# Patient Record
Sex: MALE | Race: ASIAN | NOT HISPANIC OR LATINO | URBAN - METROPOLITAN AREA
[De-identification: names, ages, dates, MRNs, and addresses within clinical notes are randomized per-mention and may not be internally consistent; named-entity substitution may affect disease eponyms.]

---

## 2017-05-15 ENCOUNTER — OUTPATIENT (OUTPATIENT)
Dept: OUTPATIENT SERVICES | Facility: HOSPITAL | Age: 47
LOS: 1 days | Discharge: HOME | End: 2017-05-15

## 2017-06-28 DIAGNOSIS — R52 PAIN, UNSPECIFIED: ICD-10-CM

## 2020-02-12 PROBLEM — Z00.00 ENCOUNTER FOR PREVENTIVE HEALTH EXAMINATION: Status: ACTIVE | Noted: 2020-02-12

## 2020-02-27 ENCOUNTER — APPOINTMENT (OUTPATIENT)
Dept: CARDIOLOGY | Facility: CLINIC | Age: 50
End: 2020-02-27

## 2021-10-28 ENCOUNTER — INPATIENT (INPATIENT)
Facility: HOSPITAL | Age: 51
LOS: 1 days | Discharge: HOME | End: 2021-10-30
Attending: INTERNAL MEDICINE | Admitting: INTERNAL MEDICINE
Payer: COMMERCIAL

## 2021-10-28 VITALS
TEMPERATURE: 96 F | HEART RATE: 85 BPM | DIASTOLIC BLOOD PRESSURE: 91 MMHG | WEIGHT: 265 LBS | RESPIRATION RATE: 17 BRPM | SYSTOLIC BLOOD PRESSURE: 160 MMHG | OXYGEN SATURATION: 100 %

## 2021-10-28 DIAGNOSIS — Z98.890 OTHER SPECIFIED POSTPROCEDURAL STATES: Chronic | ICD-10-CM

## 2021-10-28 LAB
ALBUMIN SERPL ELPH-MCNC: 4.6 G/DL — SIGNIFICANT CHANGE UP (ref 3.5–5.2)
ALP SERPL-CCNC: 61 U/L — SIGNIFICANT CHANGE UP (ref 30–115)
ALT FLD-CCNC: 23 U/L — SIGNIFICANT CHANGE UP (ref 0–41)
ANION GAP SERPL CALC-SCNC: 12 MMOL/L — SIGNIFICANT CHANGE UP (ref 7–14)
AST SERPL-CCNC: 16 U/L — SIGNIFICANT CHANGE UP (ref 0–41)
BASOPHILS # BLD AUTO: 0.04 K/UL — SIGNIFICANT CHANGE UP (ref 0–0.2)
BASOPHILS NFR BLD AUTO: 0.6 % — SIGNIFICANT CHANGE UP (ref 0–1)
BILIRUB SERPL-MCNC: 0.5 MG/DL — SIGNIFICANT CHANGE UP (ref 0.2–1.2)
BUN SERPL-MCNC: 12 MG/DL — SIGNIFICANT CHANGE UP (ref 10–20)
CALCIUM SERPL-MCNC: 9.6 MG/DL — SIGNIFICANT CHANGE UP (ref 8.5–10.1)
CHLORIDE SERPL-SCNC: 104 MMOL/L — SIGNIFICANT CHANGE UP (ref 98–110)
CK MB CFR SERPL CALC: 6.4 NG/ML — HIGH (ref 0.6–6.3)
CK SERPL-CCNC: 178 U/L — SIGNIFICANT CHANGE UP (ref 0–225)
CO2 SERPL-SCNC: 26 MMOL/L — SIGNIFICANT CHANGE UP (ref 17–32)
CREAT SERPL-MCNC: 1.1 MG/DL — SIGNIFICANT CHANGE UP (ref 0.7–1.5)
EOSINOPHIL # BLD AUTO: 0.15 K/UL — SIGNIFICANT CHANGE UP (ref 0–0.7)
EOSINOPHIL NFR BLD AUTO: 2.4 % — SIGNIFICANT CHANGE UP (ref 0–8)
GLUCOSE SERPL-MCNC: 222 MG/DL — HIGH (ref 70–99)
HCT VFR BLD CALC: 48.5 % — SIGNIFICANT CHANGE UP (ref 42–52)
HGB BLD-MCNC: 16.2 G/DL — SIGNIFICANT CHANGE UP (ref 14–18)
IMM GRANULOCYTES NFR BLD AUTO: 0.2 % — SIGNIFICANT CHANGE UP (ref 0.1–0.3)
LYMPHOCYTES # BLD AUTO: 1.8 K/UL — SIGNIFICANT CHANGE UP (ref 1.2–3.4)
LYMPHOCYTES # BLD AUTO: 28.5 % — SIGNIFICANT CHANGE UP (ref 20.5–51.1)
MCHC RBC-ENTMCNC: 29.2 PG — SIGNIFICANT CHANGE UP (ref 27–31)
MCHC RBC-ENTMCNC: 33.4 G/DL — SIGNIFICANT CHANGE UP (ref 32–37)
MCV RBC AUTO: 87.5 FL — SIGNIFICANT CHANGE UP (ref 80–94)
MONOCYTES # BLD AUTO: 0.47 K/UL — SIGNIFICANT CHANGE UP (ref 0.1–0.6)
MONOCYTES NFR BLD AUTO: 7.4 % — SIGNIFICANT CHANGE UP (ref 1.7–9.3)
NEUTROPHILS # BLD AUTO: 3.84 K/UL — SIGNIFICANT CHANGE UP (ref 1.4–6.5)
NEUTROPHILS NFR BLD AUTO: 60.9 % — SIGNIFICANT CHANGE UP (ref 42.2–75.2)
NRBC # BLD: 0 /100 WBCS — SIGNIFICANT CHANGE UP (ref 0–0)
PLATELET # BLD AUTO: 218 K/UL — SIGNIFICANT CHANGE UP (ref 130–400)
POTASSIUM SERPL-MCNC: 4.7 MMOL/L — SIGNIFICANT CHANGE UP (ref 3.5–5)
POTASSIUM SERPL-SCNC: 4.7 MMOL/L — SIGNIFICANT CHANGE UP (ref 3.5–5)
PROT SERPL-MCNC: 7.7 G/DL — SIGNIFICANT CHANGE UP (ref 6–8)
RBC # BLD: 5.54 M/UL — SIGNIFICANT CHANGE UP (ref 4.7–6.1)
RBC # FLD: 12.4 % — SIGNIFICANT CHANGE UP (ref 11.5–14.5)
SARS-COV-2 RNA SPEC QL NAA+PROBE: SIGNIFICANT CHANGE UP
SODIUM SERPL-SCNC: 142 MMOL/L — SIGNIFICANT CHANGE UP (ref 135–146)
TROPONIN T SERPL-MCNC: 0.04 NG/ML — CRITICAL HIGH
TROPONIN T SERPL-MCNC: 0.06 NG/ML — CRITICAL HIGH
TROPONIN T SERPL-MCNC: <0.01 NG/ML — SIGNIFICANT CHANGE UP
WBC # BLD: 6.31 K/UL — SIGNIFICANT CHANGE UP (ref 4.8–10.8)
WBC # FLD AUTO: 6.31 K/UL — SIGNIFICANT CHANGE UP (ref 4.8–10.8)

## 2021-10-28 PROCEDURE — 93010 ELECTROCARDIOGRAM REPORT: CPT

## 2021-10-28 PROCEDURE — 93010 ELECTROCARDIOGRAM REPORT: CPT | Mod: 77

## 2021-10-28 PROCEDURE — 76937 US GUIDE VASCULAR ACCESS: CPT | Mod: 26

## 2021-10-28 PROCEDURE — 36000 PLACE NEEDLE IN VEIN: CPT

## 2021-10-28 PROCEDURE — 99234 HOSP IP/OBS SM DT SF/LOW 45: CPT | Mod: 25

## 2021-10-28 PROCEDURE — 93308 TTE F-UP OR LMTD: CPT | Mod: 26

## 2021-10-28 PROCEDURE — 71045 X-RAY EXAM CHEST 1 VIEW: CPT | Mod: 26

## 2021-10-28 PROCEDURE — 99223 1ST HOSP IP/OBS HIGH 75: CPT | Mod: GC

## 2021-10-28 RX ORDER — ASPIRIN/CALCIUM CARB/MAGNESIUM 324 MG
324 TABLET ORAL ONCE
Refills: 0 | Status: COMPLETED | OUTPATIENT
Start: 2021-10-28 | End: 2021-10-28

## 2021-10-28 RX ORDER — METOPROLOL TARTRATE 50 MG
100 TABLET ORAL
Refills: 0 | Status: DISCONTINUED | OUTPATIENT
Start: 2021-10-28 | End: 2021-10-30

## 2021-10-28 RX ORDER — ATORVASTATIN CALCIUM 80 MG/1
40 TABLET, FILM COATED ORAL AT BEDTIME
Refills: 0 | Status: DISCONTINUED | OUTPATIENT
Start: 2021-10-28 | End: 2021-10-29

## 2021-10-28 RX ORDER — ENOXAPARIN SODIUM 100 MG/ML
40 INJECTION SUBCUTANEOUS DAILY
Refills: 0 | Status: DISCONTINUED | OUTPATIENT
Start: 2021-10-28 | End: 2021-10-29

## 2021-10-28 RX ORDER — ACETAMINOPHEN 500 MG
650 TABLET ORAL EVERY 6 HOURS
Refills: 0 | Status: DISCONTINUED | OUTPATIENT
Start: 2021-10-28 | End: 2021-10-30

## 2021-10-28 RX ORDER — ASPIRIN/CALCIUM CARB/MAGNESIUM 324 MG
81 TABLET ORAL DAILY
Refills: 0 | Status: DISCONTINUED | OUTPATIENT
Start: 2021-10-28 | End: 2021-10-30

## 2021-10-28 RX ORDER — METOPROLOL TARTRATE 50 MG
100 TABLET ORAL ONCE
Refills: 0 | Status: COMPLETED | OUTPATIENT
Start: 2021-10-28 | End: 2021-10-28

## 2021-10-28 RX ADMIN — Medication 324 MILLIGRAM(S): at 12:50

## 2021-10-28 RX ADMIN — Medication 100 MILLIGRAM(S): at 12:43

## 2021-10-28 NOTE — ED CDU PROVIDER INITIAL DAY NOTE - MEDICAL DECISION MAKING DETAILS
49yo man h/o HTN< DM, HLD, ABDI was placed in CDU for chest pain workup after an unremarkable ED eval with labs, EKG, CXR. 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 VS, exam as noted, pt nontoxic appearing, lungs CTA, CVS1S2 RRR abd soft, NT, ND. Plan is for telemetry, serial EKG/enzymes, CCTA.

## 2021-10-28 NOTE — ED PROVIDER NOTE - CARDIAC, MLM
Normal rate, regular rhythm.  Heart sounds S1, S2.  No murmurs, rubs or gallops.  No lower extremity tenderness/edema.

## 2021-10-28 NOTE — ED PROCEDURE NOTE - PROCEDURE NAME, MLM
Point of Care Ultrasound Vascular Access
Point of Care Ultrasound Cardiac
Point of Care Ultrasound Cardiac

## 2021-10-28 NOTE — ED ADULT NURSE NOTE - PRO INTERPRETER NEED 2
Per Tadeo, if a sample is received and the order is , they dispose of the sample. Therefore patient will need to complete the test again. I will fill out and fax a new order. We did receive the  order in Oct 2019 but there was notification from them that a sample had been received with an  order. Unfortunately.    English

## 2021-10-28 NOTE — CHART NOTE - NSCHARTNOTEFT_GEN_A_CORE
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 Pain is substernal and pressure like sensation. Nonexertional, nonpleuritic and nonreproducible in nature. Patient admitted to obs unit initially and planned for CCTA. However, patient noted trop 0.04 and subsequently writer called to bedside for further eval. Patient noted VS stable and no acute distress. RRR, no murmur. No LE swelling. Patient denied active chest pain at present. EKG reviewed without acute ST changes. Will admit patient to  cardiology telemetry unit for further care.    Plan:  - Repeat EKG and check next set of enzymes at midnight.  - If recurrent chest pain or significant trop increase, will treat with hep gtt.  - C/w all home regimen including ASA, statin and BB.  - CCTA in the am.  - BP control.

## 2021-10-28 NOTE — ED CDU PROVIDER INITIAL DAY NOTE - PROGRESS NOTE DETAILS
pt seen bedside and given copy of lab results because he wants to take a look. pt will go for CCTA in the morning. aware and in agreement to plan. 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 Jim Blancas tele; cardio fellow to see pt in Ed;

## 2021-10-28 NOTE — ED CDU PROVIDER INITIAL DAY NOTE - ATTENDING CONTRIBUTION TO CARE
51yo man h/o HTN< DM, HLD, ABDI was placed in CDU for chest pain workup after an unremarkable ED eval with labs, EKG, CXR. 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 VS, exam as noted, pt nontoxic appearing, lungs CTA, CVS1S2 RRR abd soft, NT, ND. Plan is for telemetry, serial EKG/enzymes, CCTA.

## 2021-10-28 NOTE — ED PROVIDER NOTE - OBJECTIVE STATEMENT
50 y.o. male with a PMH of HTN, hyperlipidemia, and DM presented to the ER c/o intermittent chest pain for the past several days associated with high BP.  Pt states that pain worse with exertion.  (+) diaphoresis and radiation to Left arm and jaw.  Feeling better.  (+) family h/o early heart disease- father had CABGx3 at age 57.  Pt denies fever, chills, cough, SOB, nausea, vomiting, palpitations, recent travel, smoking, drug use. No other complaints.

## 2021-10-28 NOTE — ED PROVIDER NOTE - ATTENDING CONTRIBUTION TO CARE
49 y/o male h/o HTN, HLD, DM, ABDI, non-smoker, FH CAD (father) p/w CP x several days, intermittent, occasionally radiates to neck / jaw, somewhat worse with exertion, denies  fever, nausea, vomiting, diaphoresis, hemoptysis, SOB, orthopnea, PND, LE pain or swelling, recent immobilization or travel or other associated complaints at present.    Previous cardiac evaluation; denies  No old chart available for review.  I have reviewed and agree with the initial nursing note, except as documented in my note.    VSS, awake, alert, oropharynx clear, mmm, no JVD or bruit, no skin rash or lesions, chest CTAB, non-labored breathing, no w/r/r, +S1/S2, RRR, no m/r/g, abdomen soft, NT, ND, +BS, no pulsatile masses, no peripheral edema or deformities, equal pulses upper and lower extremities, alert, clear speech.

## 2021-10-28 NOTE — ED CDU PROVIDER INITIAL DAY NOTE - OBJECTIVE STATEMENT
pt is a 51 y/o male pmhx of HTN, HLD, DM, ABDI, comes to the ed c/o CP x 3 days. pt states cp radiates to neck /jaw. pt states his cp started when playing sports outside, and is worse with exertion. pt state he has strong family hx of CAD. mother and father had MI's at young age like 40's pt also c/o extremely high BP recently and bad headaches. pt states he was not sure if the headache was due to not smoking weed for 2 weeks. pt states he ahs been smoking weed for 15 yrs. pt denies  fever, nausea, vomiting, diaphoresis, hemoptysis, orthopnea, PND, LE pain or swelling. pt is a 51 y/o male h/o HTN, HLD, DM, ABDI, non-smoker,  CP x several days, intermittent, occasionally radiates to neck / jaw, somewhat worse with exertion, denies  fever, nausea, vomiting, diaphoresis, hemoptysis, SOB, orthopnea, PND, LE pain or swelling, recent immobilization or travel or other associated complaints at present. pt is a 49 y/o male h/o HTN, HLD, DM, ABDI, non-smoker presents  c/o  CP radiating to neck / jaw. pt states he has had the chest pain on and off for a few days and its not getting better. pt rates the pain 7/10. pt states pain is worse with exertion. pt denies  fever, nausea, vomiting, diaphoresis, hemoptysis, SOB, orthopnea, PND, LE pain or swelling, recent immobilization or travel or other associated complaints at present.

## 2021-10-29 LAB
A1C WITH ESTIMATED AVERAGE GLUCOSE RESULT: 8.8 % — HIGH (ref 4–5.6)
ALBUMIN SERPL ELPH-MCNC: 4.3 G/DL — SIGNIFICANT CHANGE UP (ref 3.5–5.2)
ALP SERPL-CCNC: 60 U/L — SIGNIFICANT CHANGE UP (ref 30–115)
ALT FLD-CCNC: 24 U/L — SIGNIFICANT CHANGE UP (ref 0–41)
ANION GAP SERPL CALC-SCNC: 12 MMOL/L — SIGNIFICANT CHANGE UP (ref 7–14)
APTT BLD: 51.6 SEC — HIGH (ref 27–39.2)
AST SERPL-CCNC: 21 U/L — SIGNIFICANT CHANGE UP (ref 0–41)
BASOPHILS # BLD AUTO: 0.04 K/UL — SIGNIFICANT CHANGE UP (ref 0–0.2)
BASOPHILS NFR BLD AUTO: 0.7 % — SIGNIFICANT CHANGE UP (ref 0–1)
BILIRUB SERPL-MCNC: 0.6 MG/DL — SIGNIFICANT CHANGE UP (ref 0.2–1.2)
BUN SERPL-MCNC: 13 MG/DL — SIGNIFICANT CHANGE UP (ref 10–20)
CALCIUM SERPL-MCNC: 9.5 MG/DL — SIGNIFICANT CHANGE UP (ref 8.5–10.1)
CHLORIDE SERPL-SCNC: 102 MMOL/L — SIGNIFICANT CHANGE UP (ref 98–110)
CHOLEST SERPL-MCNC: 154 MG/DL — SIGNIFICANT CHANGE UP
CO2 SERPL-SCNC: 25 MMOL/L — SIGNIFICANT CHANGE UP (ref 17–32)
COVID-19 SPIKE DOMAIN AB INTERP: POSITIVE
COVID-19 SPIKE DOMAIN ANTIBODY RESULT: >250 U/ML — HIGH
CREAT SERPL-MCNC: 1.1 MG/DL — SIGNIFICANT CHANGE UP (ref 0.7–1.5)
EOSINOPHIL # BLD AUTO: 0.17 K/UL — SIGNIFICANT CHANGE UP (ref 0–0.7)
EOSINOPHIL NFR BLD AUTO: 2.8 % — SIGNIFICANT CHANGE UP (ref 0–8)
ESTIMATED AVERAGE GLUCOSE: 206 MG/DL — HIGH (ref 68–114)
GLUCOSE BLDC GLUCOMTR-MCNC: 176 MG/DL — HIGH (ref 70–99)
GLUCOSE BLDC GLUCOMTR-MCNC: 196 MG/DL — HIGH (ref 70–99)
GLUCOSE BLDC GLUCOMTR-MCNC: 210 MG/DL — HIGH (ref 70–99)
GLUCOSE SERPL-MCNC: 221 MG/DL — HIGH (ref 70–99)
HCT VFR BLD CALC: 50.5 % — SIGNIFICANT CHANGE UP (ref 42–52)
HDLC SERPL-MCNC: 35 MG/DL — LOW
HGB BLD-MCNC: 16.4 G/DL — SIGNIFICANT CHANGE UP (ref 14–18)
IMM GRANULOCYTES NFR BLD AUTO: 0.3 % — SIGNIFICANT CHANGE UP (ref 0.1–0.3)
LIPID PNL WITH DIRECT LDL SERPL: 85 MG/DL — SIGNIFICANT CHANGE UP
LYMPHOCYTES # BLD AUTO: 1.72 K/UL — SIGNIFICANT CHANGE UP (ref 1.2–3.4)
LYMPHOCYTES # BLD AUTO: 28.5 % — SIGNIFICANT CHANGE UP (ref 20.5–51.1)
MAGNESIUM SERPL-MCNC: 2.1 MG/DL — SIGNIFICANT CHANGE UP (ref 1.8–2.4)
MCHC RBC-ENTMCNC: 28.8 PG — SIGNIFICANT CHANGE UP (ref 27–31)
MCHC RBC-ENTMCNC: 32.5 G/DL — SIGNIFICANT CHANGE UP (ref 32–37)
MCV RBC AUTO: 88.8 FL — SIGNIFICANT CHANGE UP (ref 80–94)
MONOCYTES # BLD AUTO: 0.44 K/UL — SIGNIFICANT CHANGE UP (ref 0.1–0.6)
MONOCYTES NFR BLD AUTO: 7.3 % — SIGNIFICANT CHANGE UP (ref 1.7–9.3)
NEUTROPHILS # BLD AUTO: 3.65 K/UL — SIGNIFICANT CHANGE UP (ref 1.4–6.5)
NEUTROPHILS NFR BLD AUTO: 60.4 % — SIGNIFICANT CHANGE UP (ref 42.2–75.2)
NON HDL CHOLESTEROL: 119 MG/DL — SIGNIFICANT CHANGE UP
NRBC # BLD: 0 /100 WBCS — SIGNIFICANT CHANGE UP (ref 0–0)
PLATELET # BLD AUTO: 210 K/UL — SIGNIFICANT CHANGE UP (ref 130–400)
POTASSIUM SERPL-MCNC: 4.3 MMOL/L — SIGNIFICANT CHANGE UP (ref 3.5–5)
POTASSIUM SERPL-SCNC: 4.3 MMOL/L — SIGNIFICANT CHANGE UP (ref 3.5–5)
PROT SERPL-MCNC: 7.5 G/DL — SIGNIFICANT CHANGE UP (ref 6–8)
RBC # BLD: 5.69 M/UL — SIGNIFICANT CHANGE UP (ref 4.7–6.1)
RBC # FLD: 12.6 % — SIGNIFICANT CHANGE UP (ref 11.5–14.5)
SARS-COV-2 IGG+IGM SERPL QL IA: >250 U/ML — HIGH
SARS-COV-2 IGG+IGM SERPL QL IA: POSITIVE
SODIUM SERPL-SCNC: 139 MMOL/L — SIGNIFICANT CHANGE UP (ref 135–146)
TRIGL SERPL-MCNC: 189 MG/DL — HIGH
TROPONIN T SERPL-MCNC: 0.04 NG/ML — CRITICAL HIGH
TSH SERPL-MCNC: 2.56 UIU/ML — SIGNIFICANT CHANGE UP (ref 0.27–4.2)
WBC # BLD: 6.04 K/UL — SIGNIFICANT CHANGE UP (ref 4.8–10.8)
WBC # FLD AUTO: 6.04 K/UL — SIGNIFICANT CHANGE UP (ref 4.8–10.8)

## 2021-10-29 PROCEDURE — 92928 PRQ TCAT PLMT NTRAC ST 1 LES: CPT | Mod: LC

## 2021-10-29 PROCEDURE — 93010 ELECTROCARDIOGRAM REPORT: CPT

## 2021-10-29 PROCEDURE — 93458 L HRT ARTERY/VENTRICLE ANGIO: CPT | Mod: 26,XU

## 2021-10-29 RX ORDER — NITROGLYCERIN 6.5 MG
10 CAPSULE, EXTENDED RELEASE ORAL
Qty: 50 | Refills: 0 | Status: DISCONTINUED | OUTPATIENT
Start: 2021-10-29 | End: 2021-10-30

## 2021-10-29 RX ORDER — METOPROLOL TARTRATE 50 MG
1 TABLET ORAL
Qty: 0 | Refills: 0 | DISCHARGE

## 2021-10-29 RX ORDER — DEXTROSE 50 % IN WATER 50 %
12.5 SYRINGE (ML) INTRAVENOUS ONCE
Refills: 0 | Status: DISCONTINUED | OUTPATIENT
Start: 2021-10-29 | End: 2021-10-29

## 2021-10-29 RX ORDER — DEXTROSE 50 % IN WATER 50 %
25 SYRINGE (ML) INTRAVENOUS ONCE
Refills: 0 | Status: DISCONTINUED | OUTPATIENT
Start: 2021-10-29 | End: 2021-10-29

## 2021-10-29 RX ORDER — SODIUM CHLORIDE 9 MG/ML
1000 INJECTION, SOLUTION INTRAVENOUS
Refills: 0 | Status: DISCONTINUED | OUTPATIENT
Start: 2021-10-29 | End: 2021-10-29

## 2021-10-29 RX ORDER — NITROGLYCERIN 6.5 MG
0.4 CAPSULE, EXTENDED RELEASE ORAL
Refills: 0 | Status: DISCONTINUED | OUTPATIENT
Start: 2021-10-29 | End: 2021-10-29

## 2021-10-29 RX ORDER — PRASUGREL 5 MG/1
30 TABLET, FILM COATED ORAL ONCE
Refills: 0 | Status: COMPLETED | OUTPATIENT
Start: 2021-10-29 | End: 2021-10-29

## 2021-10-29 RX ORDER — ASPIRIN/CALCIUM CARB/MAGNESIUM 324 MG
1 TABLET ORAL
Qty: 0 | Refills: 0 | DISCHARGE

## 2021-10-29 RX ORDER — ATORVASTATIN CALCIUM 80 MG/1
80 TABLET, FILM COATED ORAL AT BEDTIME
Refills: 0 | Status: DISCONTINUED | OUTPATIENT
Start: 2021-10-29 | End: 2021-10-30

## 2021-10-29 RX ORDER — TICAGRELOR 90 MG/1
90 TABLET ORAL
Refills: 0 | Status: DISCONTINUED | OUTPATIENT
Start: 2021-10-29 | End: 2021-10-29

## 2021-10-29 RX ORDER — LOSARTAN POTASSIUM 100 MG/1
1 TABLET, FILM COATED ORAL
Qty: 0 | Refills: 0 | DISCHARGE

## 2021-10-29 RX ORDER — AMLODIPINE BESYLATE 2.5 MG/1
2.5 TABLET ORAL DAILY
Refills: 0 | Status: DISCONTINUED | OUTPATIENT
Start: 2021-10-29 | End: 2021-10-30

## 2021-10-29 RX ORDER — ENOXAPARIN SODIUM 100 MG/ML
40 INJECTION SUBCUTANEOUS DAILY
Refills: 0 | Status: DISCONTINUED | OUTPATIENT
Start: 2021-10-30 | End: 2021-10-30

## 2021-10-29 RX ORDER — INFLUENZA VIRUS VACCINE 15; 15; 15; 15 UG/.5ML; UG/.5ML; UG/.5ML; UG/.5ML
0.5 SUSPENSION INTRAMUSCULAR ONCE
Refills: 0 | Status: DISCONTINUED | OUTPATIENT
Start: 2021-10-29 | End: 2021-10-30

## 2021-10-29 RX ORDER — METFORMIN HYDROCHLORIDE 850 MG/1
1500 TABLET ORAL
Qty: 0 | Refills: 0 | DISCHARGE

## 2021-10-29 RX ORDER — SODIUM CHLORIDE 9 MG/ML
1000 INJECTION INTRAMUSCULAR; INTRAVENOUS; SUBCUTANEOUS
Refills: 0 | Status: DISCONTINUED | OUTPATIENT
Start: 2021-10-29 | End: 2021-10-30

## 2021-10-29 RX ORDER — LISINOPRIL 2.5 MG/1
5 TABLET ORAL DAILY
Refills: 0 | Status: DISCONTINUED | OUTPATIENT
Start: 2021-10-29 | End: 2021-10-30

## 2021-10-29 RX ORDER — PRASUGREL 5 MG/1
10 TABLET, FILM COATED ORAL DAILY
Refills: 0 | Status: DISCONTINUED | OUTPATIENT
Start: 2021-10-30 | End: 2021-10-30

## 2021-10-29 RX ORDER — TICAGRELOR 90 MG/1
1 TABLET ORAL
Qty: 60 | Refills: 3
Start: 2021-10-29 | End: 2022-02-25

## 2021-10-29 RX ORDER — PRASUGREL 5 MG/1
1 TABLET, FILM COATED ORAL
Qty: 30 | Refills: 3
Start: 2021-10-29 | End: 2022-02-25

## 2021-10-29 RX ORDER — HEPARIN SODIUM 5000 [USP'U]/ML
1000 INJECTION INTRAVENOUS; SUBCUTANEOUS
Qty: 25000 | Refills: 0 | Status: DISCONTINUED | OUTPATIENT
Start: 2021-10-29 | End: 2021-10-29

## 2021-10-29 RX ORDER — INSULIN GLARGINE 100 [IU]/ML
20 INJECTION, SOLUTION SUBCUTANEOUS AT BEDTIME
Refills: 0 | Status: DISCONTINUED | OUTPATIENT
Start: 2021-10-29 | End: 2021-10-30

## 2021-10-29 RX ORDER — GLIMEPIRIDE 1 MG
2 TABLET ORAL
Qty: 0 | Refills: 0 | DISCHARGE

## 2021-10-29 RX ORDER — GLUCAGON INJECTION, SOLUTION 0.5 MG/.1ML
1 INJECTION, SOLUTION SUBCUTANEOUS ONCE
Refills: 0 | Status: DISCONTINUED | OUTPATIENT
Start: 2021-10-29 | End: 2021-10-30

## 2021-10-29 RX ORDER — ALLOPURINOL 300 MG
1 TABLET ORAL
Qty: 0 | Refills: 0 | DISCHARGE

## 2021-10-29 RX ORDER — DEXTROSE 50 % IN WATER 50 %
15 SYRINGE (ML) INTRAVENOUS ONCE
Refills: 0 | Status: DISCONTINUED | OUTPATIENT
Start: 2021-10-29 | End: 2021-10-29

## 2021-10-29 RX ADMIN — HEPARIN SODIUM 10 UNIT(S)/HR: 5000 INJECTION INTRAVENOUS; SUBCUTANEOUS at 06:48

## 2021-10-29 RX ADMIN — Medication 3 MICROGRAM(S)/MIN: at 13:57

## 2021-10-29 RX ADMIN — Medication 81 MILLIGRAM(S): at 12:14

## 2021-10-29 RX ADMIN — Medication 100 MILLIGRAM(S): at 05:20

## 2021-10-29 RX ADMIN — Medication 100 MILLIGRAM(S): at 18:34

## 2021-10-29 RX ADMIN — ATORVASTATIN CALCIUM 80 MILLIGRAM(S): 80 TABLET, FILM COATED ORAL at 22:16

## 2021-10-29 RX ADMIN — SODIUM CHLORIDE 150 MILLILITER(S): 9 INJECTION INTRAMUSCULAR; INTRAVENOUS; SUBCUTANEOUS at 13:58

## 2021-10-29 RX ADMIN — AMLODIPINE BESYLATE 2.5 MILLIGRAM(S): 2.5 TABLET ORAL at 05:20

## 2021-10-29 RX ADMIN — PRASUGREL 30 MILLIGRAM(S): 5 TABLET, FILM COATED ORAL at 20:45

## 2021-10-29 NOTE — H&P ADULT - NSHPLABSRESULTS_GEN_ALL_CORE
LABS:  10-28 @ 23:14 Creatine 178 U/L [0 - 225]  cret                        16.2   6.31  )-----------( 218      ( 28 Oct 2021 13:30 )             48.5     10-28    142  |  104  |  12  ----------------------------<  222<H>  4.7   |  26  |  1.1    Ca    9.6      28 Oct 2021 13:30    TPro  7.7  /  Alb  4.6  /  TBili  0.5  /  DBili  x   /  AST  16  /  ALT  23  /  AlkPhos  61  10-28

## 2021-10-29 NOTE — CHART NOTE - NSCHARTNOTEFT_GEN_A_CORE
patient pharmacy called in for Brilinta 90 mg PO q12 hr x1 month and it is 207$ per/month  Brilinta D/C   [facility] called in for Prasugrel 10 mg PO Daily x1 month and it is 7$ per/month, patient is agreeing for it    patient will be given loading dose of Prasugrel 30 mg x1 at 8 pm tonight and start Prasugrel 10 mg PO Daily on 10/30/21

## 2021-10-29 NOTE — H&P ADULT - HISTORY OF PRESENT ILLNESS
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 Patient complained of CP this am which woke him up at 8am. Continued to have a couple of episodes of CP until 11am when he came to the ED. Patient also had CP on Tuesday and his BP at the time was 170/110 and therefore initially attributed it to HTN. Patient was in ED OBS today but CCTA could not be done due to many patients waiting and late in the afternoon with no available techs. troponin uptrended in ED and patient was admitted. He manages his own medications but sometimes sees Dr. Shannon. Positive FHx of cardiac disease (father, CABG and PCIs).     ED course:   vitals: /91, HR 85, afebrile, 100% on RA   labs: trop 0.01 > 0.04 > 0.06  imaging:   - ECG: no signs of ischemia  given: asa 324mg and lopressor 100mg po x 1   progress: discussed trop 0.06 w/ cardio fellow, no CP and therefore no need for heparin drip at this time

## 2021-10-29 NOTE — PATIENT PROFILE ADULT - ARRIVAL FROM
no loss of consciousness, no gait abnormality, no headache, no sensory deficits, and no weakness.
Home

## 2021-10-29 NOTE — H&P ADULT - ATTENDING COMMENTS
Patient seen and examined. Pertinent labs and imaging reviewed. I agree with the above with exceptions below:    Patient now CP free. Last episode 10/29 @ [id number]. Given metoprolol and subsided.   Multiple risk factors for CAD with intermittent CP and uptrending troponins.  Patient for C with Dr. Gentile. Transport at bedside.     R/O CAD:  -LHC and TTE post cath.  -ASA 81mg daily, Heparin gtt now  -Metoprolol tartrate 100mg PO BID.   -Increase Atorvastatin 80mg PO daily.     HTN: Near goal.   -Continue norvasc 2.5mg PO daily.   -May need to uptitrate or consider switching metoprolol to carvedilol for better control.     DM: HA1c 8.8%, poorly controlled.  -ISS inpatient    Dispo: Pending cath.

## 2021-10-29 NOTE — CHART NOTE - NSCHARTNOTEFT_GEN_A_CORE
PRE-OP DIAGNOSIS:    NSTEMI    PROCEDURE:   [x] Coronary Angiogram   [x] LHC   [] RHC   [x] Intervention (see below)        PHYSICIAN: Dr. Gentile  INTERVENTIONAL FELLOW: Dr. Patricia   CARDIOLOGY FELLOW: Dr. Gee      PROCEDURE DESCRIPTION:     Consent:  [x] Patient   [] Family Member   []  Used     Anesthesia:   [] General   [x] Sedation   [x] Local     Access & Closure:   [x] 6Fr right Radial Artery   [] 6Fr right Femoral Artery   [] 6Fr right Femoral Vein   [] 6Fr right Brachial Vein       IV Contrast:      150  mL        Intervention: PCI to OM3 (AUC 8 for revascularization)    Implants: MACRINA to OM3     FINDINGS:     Coronary Dominance: Co-dominant circulation    LM: Minor disease  LAD: Mild disease  Diag: Mild disease  LCx: Mild disease  OM1: Minor disease  OM2: Small, mild disease  OM3: 100% occlusion at ostium, intervention performed  LPDA: Mild disease   Ramus: small, minor disease  RCA: Mild disease  rPDA: Mild disease    LVEDP:  Normal      ESTIMATED BLOOD LOSS: < 10 mL      CONDITION:   [x] Good   [] Fair   [] Critical     SPECIMEN REMOVED: N/A     POST-OP DIAGNOSIS:    [] Normal Coronary Angiogram   [] Minor luminal irregularities  [] Mild Non-obstructive Coronary Artery Disease (< 50% stenosis)   [x] Significant 1-Vessel Coronary Artery Disease (100% OM3 s/p PCI) AUC 8 for revascularization    PLAN OF CARE:   [] D/C Home Today   [x] Return to In-patient bed - upgrade to CCU  [] Admit for observation   [] Return for Staged Procedure   [] CT Surgery Consult   [x] Medications: Continue aspirin and brilinta, increase lipitor to 80mg, continue statins therapy  [x] IV Fluids: 150cc/hr for 10 hours  [x] Nitro drip at 10mcg until tomorrow PRE-OP DIAGNOSIS:    NSTEMI    PROCEDURE:   [x] Coronary Angiogram   [x] LHC   [] RHC   [x] Intervention (see below)        PHYSICIAN: Dr. Gentile  INTERVENTIONAL FELLOW: Dr. Patricia   CARDIOLOGY FELLOW: Dr. Gee      PROCEDURE DESCRIPTION:     Consent:  [x] Patient   [] Family Member   []  Used     Anesthesia:   [] General   [x] Sedation   [x] Local     Access & Closure:   [x] 6Fr right Radial Artery   [] 6Fr right Femoral Artery   [] 6Fr right Femoral Vein   [] 6Fr right Brachial Vein       IV Contrast:      150  mL        Intervention: PCI to OM3 (AUC 8 for revascularization)    Implants: MACRINA to OM3     FINDINGS:     Coronary Dominance: Co-dominant circulation    LM: Minor disease  LAD: Mild disease  Diag: Mild disease  LCx: Mild disease  OM1: Minor disease  OM2: Small, mild disease  OM3: 100% occlusion at ostium, intervention performed  LPDA: Mild disease   Ramus: small, minor disease  RCA: Mild disease  rPDA: Mild disease    LVEDP:  Normal      ESTIMATED BLOOD LOSS: < 10 mL      CONDITION:   [x] Good   [] Fair   [] Critical     SPECIMEN REMOVED: N/A     POST-OP DIAGNOSIS:    [] Normal Coronary Angiogram   [] Minor luminal irregularities  [] Mild Non-obstructive Coronary Artery Disease (< 50% stenosis)   [x] Significant 1-Vessel Coronary Artery Disease (100% OM3 s/p PCI) AUC 8 for revascularization    PLAN OF CARE:   [] D/C Home Today   [x] Return to In-patient bed - upgrade to CCU  [] Return for Staged Procedure   [] CT Surgery Consult   [x] Medications: Continue aspirin and brilinta, increase lipitor to 80mg, continue statins therapy  [x] IV Fluids: 150cc/hr for 10 hours  [x] Nitro drip at 10mcg until tomorrow

## 2021-10-29 NOTE — H&P ADULT - ASSESSMENT
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    #Typical chest pain - NSTEMI  #HLD  - FLAQUITA score ~ 4   - no active chest pain now  - labs: trop 0.01 > 0.04 > 0.06  - ECG: no signs of ischemia  - s/p asa 324mg   - repeat ecg, trend trop, get echo; get lipid profile, a1c, TSH   - get CCTA in am; npo after MN   - spoke to cardio fellow regarding uptrending troponins, hold hep drip for now   - admit to cardiology 3C service  - takes toprol 100mg daily, atorvastatin 20mg hs, asa 81mg daily     - c/w lopressor 100mg bid for now for CCTA and increase to atorvastatin 40mg hs   - patient states he will either f/u with [facility] cardiology team or private doctor in [facility] (Dr. Owens)    #T2DM  - takes metformin 1500mg ER daily and glimepiride 2mg bid   - hold home meds and start lantus 20u hs     #ABDI not compliant with CPAP: f/u o/p pulm   #gout: c/w allopurinol 100mg daily     DVT ppx: lovenox  GI ppx: none  Diet: dash, npo after MN   Code Status: full code  Dispo: from home no needs; echo, CCTA, trend trops/ECGs, a1c/lipid/tsh

## 2021-10-29 NOTE — H&P ADULT - NSHPREVIEWOFSYSTEMS_GEN_ALL_CORE
CONSTITUTIONAL: No weakness, fevers or chills  EYES/ENT: No visual changes;  No vertigo or throat pain   NECK: No pain or stiffness  RESPIRATORY: No cough, wheezing, hemoptysis; No shortness of breath  CARDIOVASCULAR: + CP, no palpitations   GASTROINTESTINAL: No abdominal or epigastric pain. No nausea, vomiting, or hematemesis; No diarrhea or constipation. No melena or hematochezia.  GENITOURINARY: No dysuria, frequency or hematuria  NEUROLOGICAL: No numbness or weakness  SKIN: No itching, rashes

## 2021-10-29 NOTE — H&P ADULT - NSICDXPASTMEDICALHX_GEN_ALL_CORE_FT
PAST MEDICAL HISTORY:  Diabetes mellitus     Gout     HLD (hyperlipidemia)     HTN (hypertension)     ABDI (obstructive sleep apnea)

## 2021-10-30 ENCOUNTER — TRANSCRIPTION ENCOUNTER (OUTPATIENT)
Age: 51
End: 2021-10-30

## 2021-10-30 VITALS — HEART RATE: 87 BPM | RESPIRATION RATE: 27 BRPM | DIASTOLIC BLOOD PRESSURE: 77 MMHG | SYSTOLIC BLOOD PRESSURE: 120 MMHG

## 2021-10-30 LAB
ALBUMIN SERPL ELPH-MCNC: 3.8 G/DL — SIGNIFICANT CHANGE UP (ref 3.5–5.2)
ALP SERPL-CCNC: 51 U/L — SIGNIFICANT CHANGE UP (ref 30–115)
ALT FLD-CCNC: 21 U/L — SIGNIFICANT CHANGE UP (ref 0–41)
ANION GAP SERPL CALC-SCNC: 12 MMOL/L — SIGNIFICANT CHANGE UP (ref 7–14)
AST SERPL-CCNC: 16 U/L — SIGNIFICANT CHANGE UP (ref 0–41)
BASOPHILS # BLD AUTO: 0.03 K/UL — SIGNIFICANT CHANGE UP (ref 0–0.2)
BASOPHILS NFR BLD AUTO: 0.5 % — SIGNIFICANT CHANGE UP (ref 0–1)
BILIRUB SERPL-MCNC: 0.5 MG/DL — SIGNIFICANT CHANGE UP (ref 0.2–1.2)
BUN SERPL-MCNC: 14 MG/DL — SIGNIFICANT CHANGE UP (ref 10–20)
CALCIUM SERPL-MCNC: 8.9 MG/DL — SIGNIFICANT CHANGE UP (ref 8.5–10.1)
CHLORIDE SERPL-SCNC: 109 MMOL/L — SIGNIFICANT CHANGE UP (ref 98–110)
CO2 SERPL-SCNC: 21 MMOL/L — SIGNIFICANT CHANGE UP (ref 17–32)
CREAT SERPL-MCNC: 1.1 MG/DL — SIGNIFICANT CHANGE UP (ref 0.7–1.5)
EOSINOPHIL # BLD AUTO: 0.18 K/UL — SIGNIFICANT CHANGE UP (ref 0–0.7)
EOSINOPHIL NFR BLD AUTO: 2.8 % — SIGNIFICANT CHANGE UP (ref 0–8)
GLUCOSE BLDC GLUCOMTR-MCNC: 189 MG/DL — HIGH (ref 70–99)
GLUCOSE SERPL-MCNC: 182 MG/DL — HIGH (ref 70–99)
HCT VFR BLD CALC: 45.2 % — SIGNIFICANT CHANGE UP (ref 42–52)
HGB BLD-MCNC: 14.8 G/DL — SIGNIFICANT CHANGE UP (ref 14–18)
IMM GRANULOCYTES NFR BLD AUTO: 0.3 % — SIGNIFICANT CHANGE UP (ref 0.1–0.3)
LYMPHOCYTES # BLD AUTO: 1.45 K/UL — SIGNIFICANT CHANGE UP (ref 1.2–3.4)
LYMPHOCYTES # BLD AUTO: 22.9 % — SIGNIFICANT CHANGE UP (ref 20.5–51.1)
MAGNESIUM SERPL-MCNC: 1.9 MG/DL — SIGNIFICANT CHANGE UP (ref 1.8–2.4)
MCHC RBC-ENTMCNC: 28.8 PG — SIGNIFICANT CHANGE UP (ref 27–31)
MCHC RBC-ENTMCNC: 32.7 G/DL — SIGNIFICANT CHANGE UP (ref 32–37)
MCV RBC AUTO: 87.9 FL — SIGNIFICANT CHANGE UP (ref 80–94)
MONOCYTES # BLD AUTO: 0.53 K/UL — SIGNIFICANT CHANGE UP (ref 0.1–0.6)
MONOCYTES NFR BLD AUTO: 8.4 % — SIGNIFICANT CHANGE UP (ref 1.7–9.3)
NEUTROPHILS # BLD AUTO: 4.13 K/UL — SIGNIFICANT CHANGE UP (ref 1.4–6.5)
NEUTROPHILS NFR BLD AUTO: 65.1 % — SIGNIFICANT CHANGE UP (ref 42.2–75.2)
NRBC # BLD: 0 /100 WBCS — SIGNIFICANT CHANGE UP (ref 0–0)
PLATELET # BLD AUTO: 173 K/UL — SIGNIFICANT CHANGE UP (ref 130–400)
POTASSIUM SERPL-MCNC: 4.1 MMOL/L — SIGNIFICANT CHANGE UP (ref 3.5–5)
POTASSIUM SERPL-SCNC: 4.1 MMOL/L — SIGNIFICANT CHANGE UP (ref 3.5–5)
PROT SERPL-MCNC: 6.4 G/DL — SIGNIFICANT CHANGE UP (ref 6–8)
RBC # BLD: 5.14 M/UL — SIGNIFICANT CHANGE UP (ref 4.7–6.1)
RBC # FLD: 12.5 % — SIGNIFICANT CHANGE UP (ref 11.5–14.5)
SODIUM SERPL-SCNC: 142 MMOL/L — SIGNIFICANT CHANGE UP (ref 135–146)
WBC # BLD: 6.34 K/UL — SIGNIFICANT CHANGE UP (ref 4.8–10.8)
WBC # FLD AUTO: 6.34 K/UL — SIGNIFICANT CHANGE UP (ref 4.8–10.8)

## 2021-10-30 PROCEDURE — 99231 SBSQ HOSP IP/OBS SF/LOW 25: CPT

## 2021-10-30 PROCEDURE — 93306 TTE W/DOPPLER COMPLETE: CPT | Mod: 26

## 2021-10-30 PROCEDURE — 93010 ELECTROCARDIOGRAM REPORT: CPT

## 2021-10-30 RX ORDER — LISINOPRIL 2.5 MG/1
1 TABLET ORAL
Qty: 30 | Refills: 0
Start: 2021-10-30 | End: 2021-11-28

## 2021-10-30 RX ORDER — AMLODIPINE BESYLATE 2.5 MG/1
1 TABLET ORAL
Qty: 0 | Refills: 0 | DISCHARGE

## 2021-10-30 RX ORDER — MAGNESIUM OXIDE 400 MG ORAL TABLET 241.3 MG
400 TABLET ORAL ONCE
Refills: 0 | Status: COMPLETED | OUTPATIENT
Start: 2021-10-30 | End: 2021-10-30

## 2021-10-30 RX ORDER — ATORVASTATIN CALCIUM 80 MG/1
1 TABLET, FILM COATED ORAL
Qty: 0 | Refills: 0 | DISCHARGE

## 2021-10-30 RX ORDER — AMLODIPINE BESYLATE 2.5 MG/1
1 TABLET ORAL
Qty: 0 | Refills: 0 | DISCHARGE
Start: 2021-10-30

## 2021-10-30 RX ORDER — CHLORHEXIDINE GLUCONATE 213 G/1000ML
1 SOLUTION TOPICAL DAILY
Refills: 0 | Status: DISCONTINUED | OUTPATIENT
Start: 2021-10-30 | End: 2021-10-30

## 2021-10-30 RX ORDER — ATORVASTATIN CALCIUM 80 MG/1
1 TABLET, FILM COATED ORAL
Qty: 20 | Refills: 0
Start: 2021-10-30 | End: 2021-11-18

## 2021-10-30 RX ORDER — MAGNESIUM SULFATE 500 MG/ML
2 VIAL (ML) INJECTION ONCE
Refills: 0 | Status: COMPLETED | OUTPATIENT
Start: 2021-10-30 | End: 2021-10-30

## 2021-10-30 RX ADMIN — ENOXAPARIN SODIUM 40 MILLIGRAM(S): 100 INJECTION SUBCUTANEOUS at 12:35

## 2021-10-30 RX ADMIN — CHLORHEXIDINE GLUCONATE 1 APPLICATION(S): 213 SOLUTION TOPICAL at 13:12

## 2021-10-30 RX ADMIN — AMLODIPINE BESYLATE 2.5 MILLIGRAM(S): 2.5 TABLET ORAL at 06:42

## 2021-10-30 RX ADMIN — Medication 100 MILLIGRAM(S): at 06:42

## 2021-10-30 RX ADMIN — MAGNESIUM OXIDE 400 MG ORAL TABLET 400 MILLIGRAM(S): 241.3 TABLET ORAL at 12:34

## 2021-10-30 RX ADMIN — Medication 81 MILLIGRAM(S): at 12:34

## 2021-10-30 RX ADMIN — PRASUGREL 10 MILLIGRAM(S): 5 TABLET, FILM COATED ORAL at 12:34

## 2021-10-30 RX ADMIN — LISINOPRIL 5 MILLIGRAM(S): 2.5 TABLET ORAL at 06:41

## 2021-10-30 NOTE — DISCHARGE NOTE PROVIDER - CARE PROVIDERS DIRECT ADDRESSES
[email or web address]

## 2021-10-30 NOTE — PROGRESS NOTE ADULT - SUBJECTIVE AND OBJECTIVE BOX
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 Patient complained of CP this am which woke him up at 8am. Continued to have a couple of episodes of CP until 11am when he came to the ED. Patient also had CP on Tuesday and his BP at the time was 170/110 and therefore initially attributed it to HTN. Patient was in ED OBS today but CCTA could not be done due to many patients waiting and late in the afternoon with no available techs. troponin uptrended in ED and patient was admitted. He manages his own medications but sometimes sees Dr. Shannon. Positive FHx of cardiac disease (father, CABG and PCIs).     ED course:   vitals: /91, HR 85, afebrile, 100% on RA   labs: trop 0.01 > 0.04 > 0.06  imaging:   - ECG: no signs of ischemia  given: asa 324mg and lopressor 100mg po x 1   progress: discussed trop 0.06 w/ cardio fellow, no CP and therefore no need for heparin drip at this time    (29 Oct 2021 00:07)    Allergies    No Known Allergies    Intolerances    Pt seen and examined in CCU  Patient without complaints. Pt ambulated without issues/symptoms  Denies CP, SOB, palpitations, or dizziness, off nitro gtt since 0800  Vinh on telemetry overnight    Vital Signs Last 24 Hrs  T(C): 36.6 (30 Oct 2021 07:57), Max: 36.6 (30 Oct 2021 07:57)  T(F): 97.8 (30 Oct 2021 07:57), Max: 97.8 (30 Oct 2021 07:57)  HR: 78 (30 Oct 2021 07:57) (60 - 88)  BP: 148/92 (30 Oct 2021 07:57) (116/72 - 148/92)  BP(mean): 102 (30 Oct 2021 06:00) (89 - 109)  RR: 27 (30 Oct 2021 07:57) (13 - 27)  SpO2: 98% (30 Oct 2021 06:00) (92% - 99%)    MEDICATIONS  (STANDING):  amLODIPine   Tablet 2.5 milliGRAM(s) Oral daily  aspirin  chewable 81 milliGRAM(s) Oral daily  atorvastatin 80 milliGRAM(s) Oral at bedtime  chlorhexidine 4% Liquid 1 Application(s) Topical daily  enoxaparin Injectable 40 milliGRAM(s) SubCutaneous daily  glucagon  Injectable 1 milliGRAM(s) IntraMuscular once  influenza   Vaccine 0.5 milliLiter(s) IntraMuscular once  insulin glargine Injectable (LANTUS) 20 Unit(s) SubCutaneous at bedtime  lisinopril 5 milliGRAM(s) Oral daily  metoprolol tartrate 100 milliGRAM(s) Oral two times a day  nitroglycerin  Infusion 10 MICROgram(s)/Min (3 mL/Hr) IV Continuous <Continuous>  prasugrel 10 milliGRAM(s) Oral daily  sodium chloride 0.9%. 1000 milliLiter(s) (150 mL/Hr) IV Continuous <Continuous>    MEDICATIONS  (PRN):  acetaminophen     Tablet .. 650 milliGRAM(s) Oral every 6 hours PRN Temp greater or equal to 38C (100.4F), Mild Pain (1 - 3)      REVIEW OF SYSTEMS:          CONSTITUTIONAL: No weakness, fevers or chills          EYES/ENT: No visual changes;  No vertigo or throat pain           NECK: No pain or stiffness          RESPIRATORY: No cough, wheezing, hemoptysis          CARDIOVASCULAR: no pain, no RENTERIA, no palpitations           GASTROINTESTINAL: No abdominal or epigastric pain. No nausea, vomiting, or hematemesis;           GENITOURINARY: No dysuria, frequency or hematuria          NEUROLOGICAL: No numbness or weakness          SKIN: No itching, rashes    PHYSICAL EXAM:           CONSTITUTIONAL: Well-developed; well-nourished; in no acute distress  	SKIN: warm, dry  	HEAD: Normocephalic; atraumatic  	EYES: PERRL.  	ENT: No nasal discharge, airway clear, mucous membranes moist  	NECK: Supple; non tender.  	CARD: +S1, +S2, no murmurs, gallops, or rubs. (Regular) rate and rhythm    	RESP: No wheezes, rales or rhonchi. CTA B/L  	ABD: soft ntnd, + BS x 4 quadrants  	EXT: moves all extremities,  no clubbing, cyanosis or edema  	NEURO: Alert and oriented x3, no focal deficits          PSYCH: Cooperative, appropriate          VASCULAR:  + Rad / + PTs / + DPs          EXTREMITY:  	   Right Radial: Dressing removed, + pulses,  access site soft, no hematoma, no pain, no numbness, no signs and symptoms of infection             ECG: SR, no acute ischemic changes                                                                                                                2D ECHO: P    LABS:                        14.8   6.34  )-----------( 173      ( 30 Oct 2021 05:06 )             45.2     10-30    142  |  109  |  14  ----------------------------<  182<H>  4.1   |  21  |  1.1    Ca    8.9      30 Oct 2021 05:06  Mg     1.9     10-30    TPro  6.4  /  Alb  3.8  /  TBili  0.5  /  DBili  x   /  AST  16  /  ALT  21  /  AlkPhos  51  10-30    CARDIAC MARKERS ( 29 Oct 2021 06:30 )  x     / 0.04 ng/mL / x     / x     / x      CARDIAC MARKERS ( 28 Oct 2021 23:14 )  x     / 0.06 ng/mL / 178 U/L / x     / 6.4 ng/mL  CARDIAC MARKERS ( 28 Oct 2021 21:24 )  x     / 0.04 ng/mL / x     / x     / x      CARDIAC MARKERS ( 28 Oct 2021 13:30 )  x     / <0.01 ng/mL / x     / x     / x          Magnesium, Serum: 1.9 mg/dL [1.8 - 2.4] (10-30-21 @ 05:06)  LIVER FUNCTIONS - ( 30 Oct 2021 05:06 )  Alb: 3.8 g/dL / Pro: 6.4 g/dL / ALK PHOS: 51 U/L / ALT: 21 U/L / AST: 16 U/L / GGT: x             A/P:     NSTEMI/ S/P PCI: Intervention: PCI to OM3 (AUC 8 for revascularization)    Implants: MACRINA to OM3     FINDINGS:     Coronary Dominance: Co-dominant circulation    LM: Minor disease  LAD: Mild disease  Diag: Mild disease  LCx: Mild disease  OM1: Minor disease  OM2: Small, mild disease  OM3: 100% occlusion at ostium, intervention performed  LPDA: Mild disease   Ramus: small, minor disease  RCA: Mild disease  rPDA: Mild disease    LVEDP:  Normal      ESTIMATED BLOOD LOSS: < 10 mL      CONDITION:   [x] Good   [] Fair   [] Critical     SPECIMEN REMOVED: N/A     POST-OP DIAGNOSIS:    [] Normal Coronary Angiogram   [] Minor luminal irregularities  [] Mild Non-obstructive Coronary Artery Disease (< 50% stenosis)   [x] Significant 1-Vessel Coronary Artery Disease (100% OM3 s/p PCI) AUC 8 for revascularization    PLAN OF CARE:   [] D/C Home Today   [x] Return to In-patient bed - upgrade to CCU  [] Return for Staged Procedure   [] CT Surgery Consult   [x] Medications: Continue aspirin and brilinta, increase lipitor to 80mg, continue statins therapy  [x] IV Fluids: 150cc/hr for 10 hours  [x] Nitro drip at 10mcg until tomorrow.      Electronic Signatures:  Valdemar Gentile)  (Signed 29-Oct-2021 14:26)  	Authored: Note  	Co-Signer: Note Type, Note  John Gee)  (Signed 29-Oct-2021 13:47)  	Authored: Note Type, Note      Last Updated: 29-Oct-2021 14:26 by Valdemar Gentile)    	     Continue DAPT (EC ASA 81mg po daily, effient 10mg po daily),  B-Blocker, Statin Therapy, ACE, CCB                   add GI prophylaxis                    Effient copay $7/month, pt aware and agreeable, RX to be picked up at [facility] in [facility] today before d/c home , PT and RN aware                   hold metformin x 48 hrs post cath                   f/u echo results                   [id number], ambulate with assistance                    monitor access site/pulses                   Patient agreeing to take DAPT for at least one year or as directed by cardiologist                    Pt given instructions on importance of taking antiplatelet medication or risk acute stent thrombosis/death                   Post cath instructions, access site care and activity restrictions reviewed with patient                      Cardiac Rehab info provided                   Discussed with patient to return to hospital if experience chest pain, shortness breath, dizziness and site bleeding                   Aggressive risk factor modification, diet counseling, smoking cessation discussed with patient                       anticipate discharge patient from cardiac standpoint if echo/labs/ekg/site WNL and ambulating without symptoms                    Follow up with Cardiology Dr. Gentile in 1-2 weeks after discharge.  Instructed to call and make an appointment

## 2021-10-30 NOTE — DISCHARGE NOTE PROVIDER - NSDCCPCAREPLAN_GEN_ALL_CORE_FT
PRINCIPAL DISCHARGE DIAGNOSIS  Diagnosis: NSTEMI (non-ST elevation myocardial infarction)  Assessment and Plan of Treatment:       SECONDARY DISCHARGE DIAGNOSES  Diagnosis: Elevated troponin  Assessment and Plan of Treatment:

## 2021-10-30 NOTE — DISCHARGE NOTE PROVIDER - NSDCMRMEDTOKEN_GEN_ALL_CORE_FT
allopurinol 100 mg oral tablet: 1 tab(s) orally once a day  amLODIPine 2.5 mg oral tablet: 1 tab(s) orally once a day  Aspir 81 oral delayed release tablet: 1 tab(s) orally once a day  atorvastatin 80 mg oral tablet: 1 tab(s) orally once a day (at bedtime)  glimepiride 2 mg oral tablet: 2  orally 2 times a day  lisinopril 5 mg oral tablet: 1 tab(s) orally once a day  metFORMIN: 1500  orally once a day  HOLD METFORMIN FOR 48 HR AFTER CARDIAC CATH  prasugrel 10 mg oral tablet: 1 tab(s) orally once a day MDD:1  Toprol- mg oral tablet, extended release: 1 tab(s) orally once a day

## 2021-10-30 NOTE — DISCHARGE NOTE PROVIDER - CARE PROVIDER_API CALL
Valdemar Gentile)  Cardiovascular Disease; Internal Medicine; Interventional Cardiology; Nuclear Cardiology  [address] [place] [address], [place] [address]  Phone: [phone]  Fax: [phone]  Follow Up Time: 1 week

## 2021-10-30 NOTE — PROGRESS NOTE ADULT - SUBJECTIVE AND OBJECTIVE BOX
PATIENT:  AMY AZEVEDO  [id number]    CHIEF COMPLAINT:  Patient is a 50y old  Male who presents with a chief complaint of chest pain (29 Oct 2021 00:07)      INTERVAL HISTORY/OVERNIGHT EVENTS:  10/30: Patient has been comfortable since returning from cath lab s/p 1 stent placement. NAEO.     REVIEW OF SYSTEMS:    Constitutional:     [ ] negative [ ] fevers [ ] chills [ ] weight loss [ ] weight gain  HEENT:                  [ ] negative [ ] dry eyes [ ] eye irritation [ ] postnasal drip [ ] nasal congestion  CV:                         [ ] negative  [ ] chest pain [ ] orthopnea [ ] palpitations [ ] murmur  Resp:                     [ ] negative [ ] cough [ ] shortness of breath [ ] dyspnea [ ] wheezing [ ] sputum [ ] hemoptysis  GI:                          [ ] negative [ ] nausea [ ] vomiting [ ] diarrhea [ ] constipation [ ] abd pain [ ] dysphagia   :                        [ ] negative [ ] dysuria [ ] nocturia [ ] hematuria [ ] increased urinary frequency  Musculoskeletal: [ ] negative [ ] back pain [ ] myalgias [ ] arthralgias [ ] fracture  Skin:                       [ ] negative [ ] rash [ ] itch  Neurological:        [ ] negative [ ] headache [ ] dizziness [ ] syncope [ ] weakness [ ] numbness  Psychiatric:           [ ] negative [ ] anxiety [ ] depression  Endocrine:            [ ] negative [ ] diabetes [ ] thyroid problem  Heme/Lymph:      [ ] negative [ ] anemia [ ] bleeding problem  Allergic/Immune: [ ] negative [ ] itchy eyes [ ] nasal discharge [ ] hives [ ] angioedema    [ ] All other systems negative  [ ] Unable to assess ROS because ________.  ROS is negative except as noted in interval history  MEDICATIONS:  MEDICATIONS  (STANDING):  amLODIPine   Tablet 2.5 milliGRAM(s) Oral daily  aspirin  chewable 81 milliGRAM(s) Oral daily  atorvastatin 80 milliGRAM(s) Oral at bedtime  chlorhexidine 4% Liquid 1 Application(s) Topical daily  enoxaparin Injectable 40 milliGRAM(s) SubCutaneous daily  glucagon  Injectable 1 milliGRAM(s) IntraMuscular once  influenza   Vaccine 0.5 milliLiter(s) IntraMuscular once  insulin glargine Injectable (LANTUS) 20 Unit(s) SubCutaneous at bedtime  lisinopril 5 milliGRAM(s) Oral daily  metoprolol tartrate 100 milliGRAM(s) Oral two times a day  nitroglycerin  Infusion 10 MICROgram(s)/Min (3 mL/Hr) IV Continuous <Continuous>  prasugrel 10 milliGRAM(s) Oral daily  sodium chloride 0.9%. 1000 milliLiter(s) (150 mL/Hr) IV Continuous <Continuous>    MEDICATIONS  (PRN):  acetaminophen     Tablet .. 650 milliGRAM(s) Oral every 6 hours PRN Temp greater or equal to 38C (100.4F), Mild Pain (1 - 3)      ALLERGIES:  Allergies    No Known Allergies    Intolerances        OBJECTIVE:  ICU Vital Signs Last 24 Hrs  T(C): 36 (30 Oct 2021 04:00), Max: 36.2 (29 Oct 2021 07:46)  T(F): 96.8 (30 Oct 2021 04:00), Max: 97.2 (29 Oct 2021 07:46)  HR: 88 (30 Oct 2021 06:00) (60 - 88)  BP: 134/82 (30 Oct 2021 06:00) (116/72 - 143/72)  BP(mean): 102 (30 Oct 2021 06:00) (89 - 109)  ABP: --  ABP(mean): --  RR: 20 (30 Oct 2021 06:00) (13 - 27)  SpO2: 98% (30 Oct 2021 06:00) (92% - 99%)      Adult Advanced Hemodynamics Last 24 Hrs  CVP(mm Hg): --  CVP(cm H2O): --  CO: --  CI: --  PA: --  PA(mean): --  PCWP: --  SVR: --  SVRI: --  PVR: --  PVRI: --  CAPILLARY BLOOD GLUCOSE      POCT Blood Glucose.: 176 mg/dL (29 Oct 2021 22:10)  POCT Blood Glucose.: 196 mg/dL (29 Oct 2021 11:47)  POCT Blood Glucose.: 210 mg/dL (29 Oct 2021 08:15)    CAPILLARY BLOOD GLUCOSE      POCT Blood Glucose.: 176 mg/dL (29 Oct 2021 22:10)    I&O's Summary    29 Oct 2021 07:01  -  30 Oct 2021 07:00  --------------------------------------------------------  IN: 1585 mL / OUT: 1050 mL / NET: 535 mL      Daily     Daily Weight in k.7 (30 Oct 2021 06:00)    PHYSICAL EXAMINATION:  General: WN/WD NAD  HEENT: PERRLA, EOMI, moist mucous membranes  Neurology: A&Ox3, nonfocal, DUMONT x 4  Respiratory: CTA B/L, normal respiratory effort, no wheezes, crackles, rales  CV: RRR, S1S2, no murmurs, rubs or gallops  Abdominal: Soft, NT, ND +BS, Last BM  Extremities: No edema, + peripheral pulses  Incisions:   Tubes:    LABS:                          14.8   6.34  )-----------( 173      ( 30 Oct 2021 05:06 )             45.2     10-30    142  |  109  |  14  ----------------------------<  182<H>  4.1   |  21  |  1.1    Ca    8.9      30 Oct 2021 05:06  Mg     1.9     10-30    TPro  6.4  /  Alb  3.8  /  TBili  0.5  /  DBili  x   /  AST  16  /  ALT  21  /  AlkPhos  51  10-30    LIVER FUNCTIONS - ( 30 Oct 2021 05:06 )  Alb: 3.8 g/dL / Pro: 6.4 g/dL / ALK PHOS: 51 U/L / ALT: 21 U/L / AST: 16 U/L / GGT: x           PTT - ( 29 Oct 2021 11:09 )  PTT:51.6 sec    CARDIAC MARKERS ( 29 Oct 2021 06:30 )  x     / 0.04 ng/mL / x     / x     / x      CARDIAC MARKERS ( 28 Oct 2021 23:14 )  x     / 0.06 ng/mL / 178 U/L / x     / 6.4 ng/mL  CARDIAC MARKERS ( 28 Oct 2021 21:24 )  x     / 0.04 ng/mL / x     / x     / x      CARDIAC MARKERS ( 28 Oct 2021 13:30 )  x     / <0.01 ng/mL / x     / x     / x              TELEMETRY:     EKG:     IMAGING:           PATIENT:  AMY AZEVEDO  [id number]    CHIEF COMPLAINT:  Patient is a 50y old  Male who presents with a chief complaint of chest pain (29 Oct 2021 00:07)      INTERVAL HISTORY/OVERNIGHT EVENTS:  10/30: Patient has been comfortable since returning from cath lab s/p 1 stent placement. NAEO.     REVIEW OF SYSTEMS:    Constitutional:     [ ] negative [ ] fevers [ ] chills [ ] weight loss [ ] weight gain  HEENT:                  [ ] negative [ ] dry eyes [ ] eye irritation [ ] postnasal drip [ ] nasal congestion  CV:                         [ ] negative  [ ] chest pain [ ] orthopnea [ ] palpitations [ ] murmur  Resp:                     [ ] negative [ ] cough [ ] shortness of breath [ ] dyspnea [ ] wheezing [ ] sputum [ ] hemoptysis  GI:                          [ ] negative [ ] nausea [ ] vomiting [ ] diarrhea [ ] constipation [ ] abd pain [ ] dysphagia   :                        [ ] negative [ ] dysuria [ ] nocturia [ ] hematuria [ ] increased urinary frequency  Musculoskeletal: [ ] negative [ ] back pain [ ] myalgias [ ] arthralgias [ ] fracture  Skin:                       [ ] negative [ ] rash [ ] itch  Neurological:        [ ] negative [ ] headache [ ] dizziness [ ] syncope [ ] weakness [ ] numbness  Psychiatric:           [ ] negative [ ] anxiety [ ] depression  Endocrine:            [ ] negative [ ] diabetes [ ] thyroid problem  Heme/Lymph:      [ ] negative [ ] anemia [ ] bleeding problem  Allergic/Immune: [ ] negative [ ] itchy eyes [ ] nasal discharge [ ] hives [ ] angioedema    [ ] All other systems negative  [ ] Unable to assess ROS because ________.  ROS is negative except as noted in interval history  MEDICATIONS:  MEDICATIONS  (STANDING):  amLODIPine   Tablet 2.5 milliGRAM(s) Oral daily  aspirin  chewable 81 milliGRAM(s) Oral daily  atorvastatin 80 milliGRAM(s) Oral at bedtime  chlorhexidine 4% Liquid 1 Application(s) Topical daily  enoxaparin Injectable 40 milliGRAM(s) SubCutaneous daily  glucagon  Injectable 1 milliGRAM(s) IntraMuscular once  influenza   Vaccine 0.5 milliLiter(s) IntraMuscular once  insulin glargine Injectable (LANTUS) 20 Unit(s) SubCutaneous at bedtime  lisinopril 5 milliGRAM(s) Oral daily  metoprolol tartrate 100 milliGRAM(s) Oral two times a day  nitroglycerin  Infusion 10 MICROgram(s)/Min (3 mL/Hr) IV Continuous <Continuous>  prasugrel 10 milliGRAM(s) Oral daily  sodium chloride 0.9%. 1000 milliLiter(s) (150 mL/Hr) IV Continuous <Continuous>    MEDICATIONS  (PRN):  acetaminophen     Tablet .. 650 milliGRAM(s) Oral every 6 hours PRN Temp greater or equal to 38C (100.4F), Mild Pain (1 - 3)      ALLERGIES:  Allergies    No Known Allergies    Intolerances        OBJECTIVE:  ICU Vital Signs Last 24 Hrs  T(C): 36 (30 Oct 2021 04:00), Max: 36.2 (29 Oct 2021 07:46)  T(F): 96.8 (30 Oct 2021 04:00), Max: 97.2 (29 Oct 2021 07:46)  HR: 88 (30 Oct 2021 06:00) (60 - 88)  BP: 134/82 (30 Oct 2021 06:00) (116/72 - 143/72)  BP(mean): 102 (30 Oct 2021 06:00) (89 - 109)  ABP: --  ABP(mean): --  RR: 20 (30 Oct 2021 06:00) (13 - 27)  SpO2: 98% (30 Oct 2021 06:00) (92% - 99%)      Adult Advanced Hemodynamics Last 24 Hrs  CVP(mm Hg): --  CVP(cm H2O): --  CO: --  CI: --  PA: --  PA(mean): --  PCWP: --  SVR: --  SVRI: --  PVR: --  PVRI: --  CAPILLARY BLOOD GLUCOSE      POCT Blood Glucose.: 176 mg/dL (29 Oct 2021 22:10)  POCT Blood Glucose.: 196 mg/dL (29 Oct 2021 11:47)  POCT Blood Glucose.: 210 mg/dL (29 Oct 2021 08:15)    CAPILLARY BLOOD GLUCOSE      POCT Blood Glucose.: 176 mg/dL (29 Oct 2021 22:10)    I&O's Summary    29 Oct 2021 07:01  -  30 Oct 2021 07:00  --------------------------------------------------------  IN: 1585 mL / OUT: 1050 mL / NET: 535 mL      Daily     Daily Weight in k.7 (30 Oct 2021 06:00)    PHYSICAL EXAMINATION:  General: WN/WD NAD  HEENT: PERRLA, EOMI, moist mucous membranes  Neurology: A&Ox3, nonfocal, DUMONT x 4  Respiratory: CTA B/L, normal respiratory effort, no wheezes, crackles, rales  CV: RRR, S1S2, no murmurs, rubs or gallops  Abdominal: Soft, NT, ND +BS, Last BM  Extremities: No edema, + peripheral pulses  Incisions:   Tubes:    LABS:                          14.8   6.34  )-----------( 173      ( 30 Oct 2021 05:06 )             45.2     10-30    142  |  109  |  14  ----------------------------<  182<H>  4.1   |  21  |  1.1    Ca    8.9      30 Oct 2021 05:06  Mg     1.9     10-30    TPro  6.4  /  Alb  3.8  /  TBili  0.5  /  DBili  x   /  AST  16  /  ALT  21  /  AlkPhos  51  10-30    LIVER FUNCTIONS - ( 30 Oct 2021 05:06 )  Alb: 3.8 g/dL / Pro: 6.4 g/dL / ALK PHOS: 51 U/L / ALT: 21 U/L / AST: 16 U/L / GGT: x           PTT - ( 29 Oct 2021 11:09 )  PTT:51.6 sec    CARDIAC MARKERS ( 29 Oct 2021 06:30 )  x     / 0.04 ng/mL / x     / x     / x      CARDIAC MARKERS ( 28 Oct 2021 23:14 )  x     / 0.06 ng/mL / 178 U/L / x     / 6.4 ng/mL  CARDIAC MARKERS ( 28 Oct 2021 21:24 )  x     / 0.04 ng/mL / x     / x     / x      CARDIAC MARKERS ( 28 Oct 2021 13:30 )  x     / <0.01 ng/mL / x     / x     / x              TELEMETRY: sinus    EKG:     IMAGING:

## 2021-10-30 NOTE — DISCHARGE NOTE NURSING/CASE MANAGEMENT/SOCIAL WORK - PATIENT PORTAL LINK FT
You can access the FollowMyHealth Patient Portal offered by [facility] by registering at the following website: [email or web address]. By joining [facility]’s FollowMyHealth portal, you will also be able to view your health information using other applications (apps) compatible with our system.

## 2021-10-30 NOTE — DISCHARGE NOTE PROVIDER - HOSPITAL COURSE
The patient was admitted for NSTEMI. He was taken to the cath lab and had 1 stent placed. He was started on DAPT (ASA and Effient). His pain resolved following the cath. A follow-up TTE demonstrated normal LV function. Throughout admission he has been hemodynamically stable. The patient was admitted for chest pain  He was taken to the cath lab and had 1 stent placed. He was started on DAPT (ASA and Effient). His pain resolved following the cath. A follow-up TTE demonstrated normal LV function. Throughout admission he has been hemodynamically stable.

## 2021-10-30 NOTE — PROGRESS NOTE ADULT - ASSESSMENT
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    #Typical chest pain - NSTEMI  #HLD  - FLAQUITA score ~ 4   - no active chest pain now  - labs: trop 0.01 > 0.04 > 0.06  - ECG: no signs of ischemia  - s/p asa 324mg   - repeat ecg, trend trop, get echo; get lipid profile, a1c, TSH   - get CCTA in am; npo after MN   - spoke to cardio fellow regarding uptrending troponins, hold hep drip for now   - admit to cardiology 3C service  - takes toprol 100mg daily, atorvastatin 20mg hs, asa 81mg daily     - c/w lopressor 100mg bid for now for CCTA and increase to atorvastatin 40mg hs   - patient states he will either f/u with [facility] cardiology team or private doctor in [facility] (Dr. 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    #Typical chest pain - NSTEMI  #HLD  - FLAQUITA score ~ 4   - labs: trop 0.01 > 0.04 > 0.06  - ECG: no signs of ischemia  -echo ordered   - s/p asa 324mg   - s/p cardiac cath: Significant 1-Vessel Coronary Artery Disease (100% OM3 s/p PCI) AUC 8 for revascularization  - cont with ASA, Effient, lipitor, metoprolol and lisinopril     #T2DM  - takes metformin 1500mg ER daily and glimepiride 2mg bid   - hold home meds and start lantus 20u hs     #ABDI not compliant with CPAP: f/u o/p pulm   #gout: c/w allopurinol 100mg daily     DVT ppx: lovenox  GI ppx: none  Diet: dash, npo after MN   Code Status: full code  Dispo: CCU Assessment:  · Assessment	  51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    #Typical chest pain - NSTEMI  #HLD  - FLAQUITA score ~ 4   - labs: trop 0.01 > 0.04 > 0.06  - ECG: no signs of ischemia  -echo ordered   - s/p asa 324mg change to 81   - s/p cardiac cath: Significant 1-Vessel Coronary Artery Disease (100% OM3 s/p PCI) AUC 8 for revascularization  - cont with ASA, Effient, lipitor, metoprolol and lisinopril   - echo  - probable d/c home today     #T2DM  - takes metformin 1500mg ER daily and glimepiride 2mg bid   - hold home meds and start lantus 20u hs     #ABDI not compliant with CPAP: f/u o/p pulm   #gout: c/w allopurinol 100mg daily     DVT ppx: lovenox  GI ppx: none  Diet: dash, npo after MN   Code Status: full code  Dispo: CCU

## 2021-11-01 PROBLEM — M10.9 GOUT, UNSPECIFIED: Chronic | Status: ACTIVE | Noted: 2021-10-29

## 2021-11-01 PROBLEM — E78.5 HYPERLIPIDEMIA, UNSPECIFIED: Chronic | Status: ACTIVE | Noted: 2021-10-29

## 2021-11-01 PROBLEM — E11.9 TYPE 2 DIABETES MELLITUS WITHOUT COMPLICATIONS: Chronic | Status: ACTIVE | Noted: 2021-10-29

## 2021-11-01 PROBLEM — G47.33 OBSTRUCTIVE SLEEP APNEA (ADULT) (PEDIATRIC): Chronic | Status: ACTIVE | Noted: 2021-10-29

## 2021-11-01 PROBLEM — I10 ESSENTIAL (PRIMARY) HYPERTENSION: Chronic | Status: ACTIVE | Noted: 2021-10-29

## 2021-11-04 DIAGNOSIS — R07.9 CHEST PAIN, UNSPECIFIED: ICD-10-CM

## 2021-11-04 DIAGNOSIS — Z91.19 PATIENT'S NONCOMPLIANCE WITH OTHER MEDICAL TREATMENT AND REGIMEN: ICD-10-CM

## 2021-11-04 DIAGNOSIS — I25.119 ATHEROSCLEROTIC HEART DISEASE OF NATIVE CORONARY ARTERY WITH UNSPECIFIED ANGINA PECTORIS: ICD-10-CM

## 2021-11-04 DIAGNOSIS — Z82.49 FAMILY HISTORY OF ISCHEMIC HEART DISEASE AND OTHER DISEASES OF THE CIRCULATORY SYSTEM: ICD-10-CM

## 2021-11-04 DIAGNOSIS — I21.4 NON-ST ELEVATION (NSTEMI) MYOCARDIAL INFARCTION: ICD-10-CM

## 2021-11-04 DIAGNOSIS — G47.33 OBSTRUCTIVE SLEEP APNEA (ADULT) (PEDIATRIC): ICD-10-CM

## 2021-11-04 DIAGNOSIS — E78.5 HYPERLIPIDEMIA, UNSPECIFIED: ICD-10-CM

## 2021-11-04 DIAGNOSIS — I10 ESSENTIAL (PRIMARY) HYPERTENSION: ICD-10-CM

## 2021-11-04 DIAGNOSIS — E11.9 TYPE 2 DIABETES MELLITUS WITHOUT COMPLICATIONS: ICD-10-CM

## 2021-11-04 DIAGNOSIS — M10.9 GOUT, UNSPECIFIED: ICD-10-CM

## 2021-11-04 DIAGNOSIS — Z79.82 LONG TERM (CURRENT) USE OF ASPIRIN: ICD-10-CM

## 2021-11-04 DIAGNOSIS — Z79.84 LONG TERM (CURRENT) USE OF ORAL HYPOGLYCEMIC DRUGS: ICD-10-CM

## 2021-11-16 ENCOUNTER — RESULT CHARGE (OUTPATIENT)
Age: 51
End: 2021-11-16

## 2021-11-16 ENCOUNTER — NON-APPOINTMENT (OUTPATIENT)
Age: 51
End: 2021-11-16

## 2021-11-16 ENCOUNTER — APPOINTMENT (OUTPATIENT)
Dept: CARDIOLOGY | Facility: CLINIC | Age: 51
End: 2021-11-16
Payer: COMMERCIAL

## 2021-11-16 VITALS
RESPIRATION RATE: 18 BRPM | WEIGHT: 295 LBS | TEMPERATURE: 97.4 F | OXYGEN SATURATION: 99 % | HEART RATE: 74 BPM | HEIGHT: 71 IN | BODY MASS INDEX: 41.3 KG/M2 | DIASTOLIC BLOOD PRESSURE: 90 MMHG | SYSTOLIC BLOOD PRESSURE: 120 MMHG

## 2021-11-16 DIAGNOSIS — I25.2 OLD MYOCARDIAL INFARCTION: ICD-10-CM

## 2021-11-16 DIAGNOSIS — Z82.49 FAMILY HISTORY OF ISCHEMIC HEART DISEASE AND OTHER DISEASES OF THE CIRCULATORY SYSTEM: ICD-10-CM

## 2021-11-16 PROCEDURE — 93000 ELECTROCARDIOGRAM COMPLETE: CPT

## 2021-11-16 PROCEDURE — 99214 OFFICE O/P EST MOD 30 MIN: CPT

## 2021-11-16 RX ORDER — METFORMIN HYDROCHLORIDE 1000 MG/1
1000 TABLET, COATED ORAL TWICE DAILY
Refills: 0 | Status: ACTIVE | COMMUNITY

## 2021-11-16 RX ORDER — ASPIRIN ENTERIC COATED TABLETS 81 MG 81 MG/1
81 TABLET, DELAYED RELEASE ORAL
Refills: 0 | Status: ACTIVE | COMMUNITY

## 2021-11-16 RX ORDER — AMLODIPINE BESYLATE 2.5 MG/1
2.5 TABLET ORAL DAILY
Refills: 0 | Status: ACTIVE | COMMUNITY

## 2021-11-16 NOTE — HISTORY OF PRESENT ILLNESS
[FreeTextEntry1] : 51 yo male with pmhx as below is here for a f/up visit\par 10/21 admitted to [facility] with ami, s/p pci of lcx/om3 with leeroy\par post d/c course unrem\par no c/o cp, sob, palp\par et is stable\par ros is otherwise as below

## 2021-11-16 NOTE — ASSESSMENT
[FreeTextEntry1] : 51 yo male with pmhx and presentation as above\par cad/ami/pci of lcx\par icm\par htn/dm/dyslipidemia\par obesity\par cont meds\par add low dose hctz for better bp mx\par dm mx discussed\par lipids at goal\par stress echo 4-6 weeks\par aggressive risk modif\par diet and act as tolerated\par rtc after stress test

## 2021-11-16 NOTE — PHYSICAL EXAM
[No Acute Distress] : no acute distress [Obese] : obese [Normal Venous Pressure] : normal venous pressure [No Carotid Bruit] : no carotid bruit [Normal S1, S2] : normal S1, S2 [No Murmur] : no murmur [No Rub] : no rub [No Gallop] : no gallop [Clear Lung Fields] : clear lung fields [Good Air Entry] : good air entry [No Respiratory Distress] : no respiratory distress  [Soft] : abdomen soft [Non Tender] : non-tender [No Masses/organomegaly] : no masses/organomegaly [Normal Bowel Sounds] : normal bowel sounds [Normal Gait] : normal gait [No Edema] : no edema [No Cyanosis] : no cyanosis [No Clubbing] : no clubbing [No Varicosities] : no varicosities [No Rash] : no rash [No Skin Lesions] : no skin lesions [Moves all extremities] : moves all extremities [No Focal Deficits] : no focal deficits [Normal Speech] : normal speech [Alert and Oriented] : alert and oriented [Normal memory] : normal memory

## 2021-12-14 ENCOUNTER — APPOINTMENT (OUTPATIENT)
Dept: CARDIOLOGY | Facility: CLINIC | Age: 51
End: 2021-12-14
Payer: COMMERCIAL

## 2021-12-14 ENCOUNTER — APPOINTMENT (OUTPATIENT)
Dept: CARDIOLOGY | Facility: CLINIC | Age: 51
End: 2021-12-14

## 2021-12-14 PROCEDURE — 93306 TTE W/DOPPLER COMPLETE: CPT

## 2021-12-14 PROCEDURE — 93015 CV STRESS TEST SUPVJ I&R: CPT

## 2022-01-12 ENCOUNTER — APPOINTMENT (OUTPATIENT)
Dept: CARDIOLOGY | Facility: CLINIC | Age: 52
End: 2022-01-12
Payer: COMMERCIAL

## 2022-01-12 VITALS
WEIGHT: 289 LBS | BODY MASS INDEX: 40.46 KG/M2 | TEMPERATURE: 97.1 F | HEIGHT: 71 IN | SYSTOLIC BLOOD PRESSURE: 116 MMHG | DIASTOLIC BLOOD PRESSURE: 90 MMHG | HEART RATE: 88 BPM

## 2022-01-12 PROCEDURE — 99214 OFFICE O/P EST MOD 30 MIN: CPT

## 2022-01-12 PROCEDURE — 93000 ELECTROCARDIOGRAM COMPLETE: CPT

## 2022-01-12 RX ORDER — SEMAGLUTIDE 1.34 MG/ML
2 INJECTION, SOLUTION SUBCUTANEOUS
Qty: 1 | Refills: 0 | Status: ACTIVE | COMMUNITY

## 2022-01-12 RX ORDER — HYDROCHLOROTHIAZIDE 12.5 MG/1
12.5 TABLET ORAL DAILY
Qty: 30 | Refills: 3 | Status: ACTIVE | COMMUNITY

## 2022-01-12 NOTE — ASSESSMENT
[FreeTextEntry1] : 50 yo male with pmhx and presentation as above\par cad/ami/pci of lcx\par icm\par htn/dm/dyslipidemia\par obesity\par cont meds\par cont low dose hctz \par dm mx discussed\par lipids at goal\par stress and echo reviewed, low risk , cont with med rx for cad\par aggressive risk modif\par diet and act as tolerated\par rtc 4 months

## 2022-05-31 ENCOUNTER — APPOINTMENT (OUTPATIENT)
Dept: CARDIOLOGY | Facility: CLINIC | Age: 52
End: 2022-05-31
Payer: COMMERCIAL

## 2022-05-31 VITALS
HEIGHT: 71 IN | WEIGHT: 286 LBS | HEART RATE: 82 BPM | SYSTOLIC BLOOD PRESSURE: 130 MMHG | DIASTOLIC BLOOD PRESSURE: 94 MMHG | BODY MASS INDEX: 40.04 KG/M2

## 2022-05-31 PROCEDURE — 99213 OFFICE O/P EST LOW 20 MIN: CPT

## 2022-05-31 PROCEDURE — 93000 ELECTROCARDIOGRAM COMPLETE: CPT

## 2022-05-31 NOTE — HISTORY OF PRESENT ILLNESS
[FreeTextEntry1] : 50 yo male with pmhx as below is here for a f/up visit\par 10/21 admitted to [facility] with ami, s/p pci of lcx/om3 with leeroy\par cont to loose weight\par no c/o cp, sob, palp\par et is stable\par ros is otherwise as below

## 2022-05-31 NOTE — ASSESSMENT
[FreeTextEntry1] : 52 yo male with pmhx and presentation as above\par cad/ami/pci of lcx\par icm\par htn/dm/dyslipidemia\par obesity\par cont meds\par bp noted, non complaint with low salt diet\par dm mx discussed\par lipids at goal\par 12/21 stress and echo low risk , cont with med rx for cad\par aggressive risk modif\par diet and act as tolerated\par rtc 6 months

## 2022-11-11 ENCOUNTER — APPOINTMENT (OUTPATIENT)
Dept: CARDIOLOGY | Facility: CLINIC | Age: 52
End: 2022-11-11

## 2022-11-11 VITALS
BODY MASS INDEX: 38.78 KG/M2 | SYSTOLIC BLOOD PRESSURE: 128 MMHG | WEIGHT: 277 LBS | HEART RATE: 79 BPM | HEIGHT: 71 IN | DIASTOLIC BLOOD PRESSURE: 94 MMHG

## 2022-11-11 PROCEDURE — 93000 ELECTROCARDIOGRAM COMPLETE: CPT

## 2022-11-11 PROCEDURE — 99214 OFFICE O/P EST MOD 30 MIN: CPT | Mod: 25

## 2022-11-11 RX ORDER — GLIMEPIRIDE 4 MG/1
4 TABLET ORAL TWICE DAILY
Refills: 0 | Status: ACTIVE | COMMUNITY

## 2022-11-11 RX ORDER — NIRMATRELVIR AND RITONAVIR 300-100 MG
20 X 150 MG & KIT ORAL
Qty: 30 | Refills: 0 | Status: ACTIVE | COMMUNITY
Start: 2022-07-11

## 2022-11-11 NOTE — ASSESSMENT
[FreeTextEntry1] : 50 yo male with pmhx and presentation as above\par cad/ami/pci of lcx\par icm\par htn/dm/dyslipidemia\par obesity\par cont meds\par decrease effient to 5\par repeat labs\par 12/21 stress and echo low risk , cont with med rx for cad\par aggressive risk modif\par diet and act as tolerated\par rtc 6 months

## 2022-11-11 NOTE — HISTORY OF PRESENT ILLNESS
[FreeTextEntry1] : 52 yo male with pmhx as below is here for a f/up visit\par 10/21 admitted to [facility] with ami, s/p pci of lcx/om3 with leeroy\par lost few lbs\par no c/o cp, sob, palp\par et is stable\par ros is otherwise as below

## 2022-12-25 ENCOUNTER — RX RENEWAL (OUTPATIENT)
Age: 52
End: 2022-12-25

## 2023-06-20 ENCOUNTER — APPOINTMENT (OUTPATIENT)
Dept: CARDIOLOGY | Facility: CLINIC | Age: 53
End: 2023-06-20
Payer: COMMERCIAL

## 2023-06-20 ENCOUNTER — NON-APPOINTMENT (OUTPATIENT)
Age: 53
End: 2023-06-20

## 2023-06-20 VITALS
BODY MASS INDEX: 40.04 KG/M2 | DIASTOLIC BLOOD PRESSURE: 90 MMHG | TEMPERATURE: 97 F | HEIGHT: 71 IN | SYSTOLIC BLOOD PRESSURE: 130 MMHG | OXYGEN SATURATION: 96 % | WEIGHT: 286 LBS | HEART RATE: 68 BPM | RESPIRATION RATE: 18 BRPM

## 2023-06-20 DIAGNOSIS — I10 ESSENTIAL (PRIMARY) HYPERTENSION: ICD-10-CM

## 2023-06-20 DIAGNOSIS — Z98.61 ATHEROSCLEROTIC HEART DISEASE OF NATIVE CORONARY ARTERY W/OUT ANGINA PECTORIS: ICD-10-CM

## 2023-06-20 DIAGNOSIS — E78.5 HYPERLIPIDEMIA, UNSPECIFIED: ICD-10-CM

## 2023-06-20 DIAGNOSIS — I25.10 ATHEROSCLEROTIC HEART DISEASE OF NATIVE CORONARY ARTERY W/OUT ANGINA PECTORIS: ICD-10-CM

## 2023-06-20 DIAGNOSIS — E11.9 TYPE 2 DIABETES MELLITUS W/OUT COMPLICATIONS: ICD-10-CM

## 2023-06-20 PROCEDURE — 99214 OFFICE O/P EST MOD 30 MIN: CPT | Mod: 25

## 2023-06-20 PROCEDURE — 93000 ELECTROCARDIOGRAM COMPLETE: CPT

## 2023-06-20 RX ORDER — ATORVASTATIN CALCIUM 40 MG/1
40 TABLET, FILM COATED ORAL DAILY
Qty: 90 | Refills: 3 | Status: ACTIVE | COMMUNITY
Start: 1900-01-01 | End: 1900-01-01

## 2023-06-20 NOTE — ASSESSMENT
[FreeTextEntry1] : 53 yo male with pmhx and presentation as above\par cad/ami/pci of lcx\par icm\par htn/dm/dyslipidemia\par obesity\par cont meds\par repeat labs reviewed, needs better dm mx\par 12/21 stress and echo low risk , cont with med rx for cad\par aggressive risk modif\par diet and act as tolerated\par rtc 6 months

## 2023-06-20 NOTE — HISTORY OF PRESENT ILLNESS
[FreeTextEntry1] : 51 yo male with pmhx as below is here for a f/up visit\par 10/21 admitted to [facility] with ami, s/p pci of lcx/om3 with leeroy\par gained few lbs\par no c/o cp, sob, palp\par et is stable\par ros is otherwise as below

## 2023-08-11 ENCOUNTER — APPOINTMENT (OUTPATIENT)
Dept: ORTHOPEDIC SURGERY | Facility: CLINIC | Age: 53
End: 2023-08-11
Payer: COMMERCIAL

## 2023-08-11 DIAGNOSIS — S83.211A BUCKET-HANDLE TEAR OF MEDIAL MENISCUS, CURRENT INJURY, RIGHT KNEE, INITIAL ENCOUNTER: ICD-10-CM

## 2023-08-11 PROCEDURE — 73562 X-RAY EXAM OF KNEE 3: CPT | Mod: RT

## 2023-08-11 PROCEDURE — 99213 OFFICE O/P EST LOW 20 MIN: CPT

## 2023-08-11 NOTE — DISCUSSION/SUMMARY
[de-identified] : At this point sent stat authorization for MRI of the right knee to evaluate for bucket-handle medial meniscus tear.  The patient's knee is locked and he cannot actively extend the knee nor can I passively extend the knee.  He lacks many degrees of full extension.  The MRI is being ordered to evaluate for a bucket-handle medial meniscus tear in which case we would recommend surgery to address this.  He will call us 2 days after the MRI is performed so we can discuss the results, at that point time we will make a follow-up and treatment plan.

## 2023-08-11 NOTE — DATA REVIEWED
[Right] : of the right [Knee] : knee [FreeTextEntry1] : 3 views of the right knee were obtained in the office today and show bone-on-bone osteoarthritis of the lateral compartment of the knee, cortical screws from ACL repair, well-preserved medial compartment, chondrocalcinosis noted in the medial compartment.  No fracture noted.

## 2023-08-11 NOTE — HISTORY OF PRESENT ILLNESS
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  Last night while doing squats his knee locked up on him.  He cannot extend his knee at all now.  He does have a history of an ACL reconstruction with allograft in 1998.  He has history of diabetes, hypertension and heart disease.  He is on metoprolol, metformin, 81 mg aspirin, prasugrel, glimepiride, Ozempic, Lipitor and losartan.  He noticed an effusion in the knee as well.  He tried icing the knee and the swelling has not completely resolved.

## 2023-08-11 NOTE — IMAGING
[de-identified] : Physical exam of the right knee: There is an effusion.  Flexion to 110 degrees, his knee is locked in flexion, he cannot actively extend it nor can I passively extend it.  Medial joint line tenderness to palpation, positive Solomon's.  Intact to light touch and sensation.

## 2023-08-14 ENCOUNTER — APPOINTMENT (OUTPATIENT)
Dept: MRI IMAGING | Facility: CLINIC | Age: 53
End: 2023-08-14

## 2023-08-30 ENCOUNTER — APPOINTMENT (OUTPATIENT)
Dept: MRI IMAGING | Facility: CLINIC | Age: 53
End: 2023-08-30
Payer: COMMERCIAL

## 2023-08-30 PROCEDURE — 73721 MRI JNT OF LWR EXTRE W/O DYE: CPT | Mod: RT

## 2023-09-11 ENCOUNTER — RX RENEWAL (OUTPATIENT)
Age: 53
End: 2023-09-11

## 2023-09-11 RX ORDER — LOSARTAN POTASSIUM 100 MG/1
100 TABLET, FILM COATED ORAL
Qty: 90 | Refills: 3 | Status: ACTIVE | COMMUNITY
Start: 1900-01-01 | End: 1900-01-01

## 2023-10-03 ENCOUNTER — APPOINTMENT (OUTPATIENT)
Dept: ORTHOPEDIC SURGERY | Facility: CLINIC | Age: 53
End: 2023-10-03
Payer: COMMERCIAL

## 2023-10-03 ENCOUNTER — NON-APPOINTMENT (OUTPATIENT)
Age: 53
End: 2023-10-03

## 2023-10-03 ENCOUNTER — APPOINTMENT (OUTPATIENT)
Dept: ORTHOPEDIC SURGERY | Facility: CLINIC | Age: 53
End: 2023-10-03

## 2023-10-03 VITALS — WEIGHT: 280 LBS | BODY MASS INDEX: 39.2 KG/M2 | HEIGHT: 71 IN

## 2023-10-03 DIAGNOSIS — S83.271D COMPLEX TEAR OF LATERAL MENISCUS, CURRENT INJURY, RIGHT KNEE, SUBSEQUENT ENCOUNTER: ICD-10-CM

## 2023-10-03 DIAGNOSIS — M23.611 OTHER SPONTANEOUS DISRUPTION OF ANTERIOR CRUCIATE LIGAMENT OF RIGHT KNEE: ICD-10-CM

## 2023-10-03 DIAGNOSIS — S83.231D COMPLEX TEAR OF MEDIAL MENISCUS, CURRENT INJURY, RIGHT KNEE, SUBSEQUENT ENCOUNTER: ICD-10-CM

## 2023-10-03 PROCEDURE — 99213 OFFICE O/P EST LOW 20 MIN: CPT

## 2023-12-02 ENCOUNTER — RX RENEWAL (OUTPATIENT)
Age: 53
End: 2023-12-02

## 2023-12-12 ENCOUNTER — APPOINTMENT (OUTPATIENT)
Dept: ORTHOPEDIC SURGERY | Facility: CLINIC | Age: 53
End: 2023-12-12

## 2024-03-14 ENCOUNTER — RX RENEWAL (OUTPATIENT)
Age: 54
End: 2024-03-14

## 2024-03-19 ENCOUNTER — APPOINTMENT (OUTPATIENT)
Dept: CARDIOLOGY | Facility: CLINIC | Age: 54
End: 2024-03-19
Payer: COMMERCIAL

## 2024-03-19 VITALS
RESPIRATION RATE: 18 BRPM | TEMPERATURE: 97 F | HEIGHT: 71 IN | HEART RATE: 90 BPM | SYSTOLIC BLOOD PRESSURE: 125 MMHG | DIASTOLIC BLOOD PRESSURE: 90 MMHG | WEIGHT: 275 LBS | BODY MASS INDEX: 38.5 KG/M2 | OXYGEN SATURATION: 99 %

## 2024-03-19 PROCEDURE — 99214 OFFICE O/P EST MOD 30 MIN: CPT | Mod: 25

## 2024-03-19 PROCEDURE — 93000 ELECTROCARDIOGRAM COMPLETE: CPT

## 2024-03-19 RX ORDER — PRASUGREL 5 MG/1
5 TABLET, FILM COATED ORAL
Qty: 90 | Refills: 2 | Status: ACTIVE | COMMUNITY
Start: 2021-11-16 | End: 1900-01-01

## 2024-03-19 RX ORDER — METOPROLOL SUCCINATE 100 MG/1
100 TABLET, EXTENDED RELEASE ORAL
Qty: 90 | Refills: 3 | Status: ACTIVE | COMMUNITY
Start: 1900-01-01 | End: 1900-01-01

## 2024-03-19 NOTE — HISTORY OF PRESENT ILLNESS
[FreeTextEntry1] : 52 yo male with pmhx as below is here for a f/up visit 10/21 admitted to [facility] with ami, s/p pci of lcx/om3 with leeroy lost few lbs no c/o cp, sob, palp et is stable ros is otherwise as below

## 2024-03-19 NOTE — ASSESSMENT
[FreeTextEntry1] : 52 yo male with pmhx and presentation as above cad/ami/pci of lcx icm htn/dm/dyslipidemia obesity cont meds repeat labs reviewed, lipids and dm better 12/21 stress and echo low risk , cont with med rx for cad stop effient 10/24 ok to decrease lipitor to 20, add coQ10 aggressive risk modif diet and act as tolerated rtc 6-9 months

## 2024-07-09 ENCOUNTER — APPOINTMENT (OUTPATIENT)
Dept: CARDIOLOGY | Facility: CLINIC | Age: 54
End: 2024-07-09

## 2025-06-05 ENCOUNTER — NON-APPOINTMENT (OUTPATIENT)
Age: 55
End: 2025-06-05

## 2025-06-06 ENCOUNTER — APPOINTMENT (OUTPATIENT)
Dept: CARDIOLOGY | Facility: CLINIC | Age: 55
End: 2025-06-06
Payer: COMMERCIAL

## 2025-06-06 ENCOUNTER — NON-APPOINTMENT (OUTPATIENT)
Age: 55
End: 2025-06-06

## 2025-06-06 VITALS
BODY MASS INDEX: 37.66 KG/M2 | HEART RATE: 81 BPM | WEIGHT: 269 LBS | HEIGHT: 71 IN | SYSTOLIC BLOOD PRESSURE: 140 MMHG | DIASTOLIC BLOOD PRESSURE: 95 MMHG

## 2025-06-06 PROCEDURE — 99214 OFFICE O/P EST MOD 30 MIN: CPT | Mod: 25

## 2025-06-06 PROCEDURE — 93000 ELECTROCARDIOGRAM COMPLETE: CPT

## 2025-06-06 RX ORDER — GLIMEPIRIDE 2 MG/1
2 TABLET ORAL DAILY
Refills: 0 | Status: ACTIVE | COMMUNITY

## 2025-06-06 RX ORDER — METFORMIN HYDROCHLORIDE 750 MG/1
750 TABLET ORAL
Refills: 0 | Status: ACTIVE | COMMUNITY

## 2025-06-06 RX ORDER — ATORVASTATIN CALCIUM 20 MG/1
20 TABLET, FILM COATED ORAL
Qty: 90 | Refills: 3 | Status: ACTIVE | COMMUNITY
Start: 1900-01-01 | End: 1900-01-01

## 2025-06-06 RX ORDER — ALLOPURINOL 100 MG/1
100 TABLET ORAL DAILY
Refills: 0 | Status: ACTIVE | COMMUNITY